# Patient Record
Sex: FEMALE | Race: WHITE | NOT HISPANIC OR LATINO | Employment: FULL TIME | URBAN - METROPOLITAN AREA
[De-identification: names, ages, dates, MRNs, and addresses within clinical notes are randomized per-mention and may not be internally consistent; named-entity substitution may affect disease eponyms.]

---

## 2017-01-28 ENCOUNTER — COMMUNICATION - HEALTHEAST (OUTPATIENT)
Dept: SCHEDULING | Facility: CLINIC | Age: 22
End: 2017-01-28

## 2018-05-30 ENCOUNTER — COMMUNICATION - HEALTHEAST (OUTPATIENT)
Dept: FAMILY MEDICINE | Facility: CLINIC | Age: 23
End: 2018-05-30

## 2021-05-30 ENCOUNTER — RECORDS - HEALTHEAST (OUTPATIENT)
Dept: ADMINISTRATIVE | Facility: CLINIC | Age: 26
End: 2021-05-30

## 2021-05-31 ENCOUNTER — RECORDS - HEALTHEAST (OUTPATIENT)
Dept: ADMINISTRATIVE | Facility: CLINIC | Age: 26
End: 2021-05-31

## 2021-06-01 ENCOUNTER — RECORDS - HEALTHEAST (OUTPATIENT)
Dept: ADMINISTRATIVE | Facility: CLINIC | Age: 26
End: 2021-06-01

## 2021-08-30 ENCOUNTER — HOSPITAL ENCOUNTER (EMERGENCY)
Facility: CLINIC | Age: 26
Discharge: HOME | End: 2021-08-30

## 2021-08-30 VITALS
HEART RATE: 73 BPM | SYSTOLIC BLOOD PRESSURE: 125 MMHG | RESPIRATION RATE: 16 BRPM | OXYGEN SATURATION: 95 % | HEIGHT: 65 IN | DIASTOLIC BLOOD PRESSURE: 79 MMHG | WEIGHT: 140 LBS | BODY MASS INDEX: 23.32 KG/M2 | TEMPERATURE: 98.1 F

## 2021-08-30 ASSESSMENT — MIFFLIN-ST. JEOR: SCORE: 1390.92

## 2021-08-30 NOTE — ED NOTES
Pt was seen earlier for UTI - is on Macrobid - states missed a day and was told to double up on the doses - has 2 days left - was on 5 day course. [facility] and gave UA that showed no infection although she had blood in her urine (started her menses today) and they wanted her checked for pyelonephritis vs kidney stone as she has continued right flank pain

## 2022-02-01 ENCOUNTER — OFFICE VISIT (OUTPATIENT)
Dept: OBGYN | Facility: CLINIC | Age: 27
End: 2022-02-01
Payer: COMMERCIAL

## 2022-02-01 VITALS
BODY MASS INDEX: 26.29 KG/M2 | WEIGHT: 158 LBS | DIASTOLIC BLOOD PRESSURE: 74 MMHG | OXYGEN SATURATION: 97 % | SYSTOLIC BLOOD PRESSURE: 124 MMHG | HEART RATE: 85 BPM

## 2022-02-01 DIAGNOSIS — R10.84 ABDOMINAL PAIN, GENERALIZED: Primary | ICD-10-CM

## 2022-02-01 DIAGNOSIS — N39.0 RECURRENT UTI: ICD-10-CM

## 2022-02-01 DIAGNOSIS — B96.89 BACTERIAL VAGINOSIS: ICD-10-CM

## 2022-02-01 DIAGNOSIS — R10.2 PELVIC PAIN IN FEMALE: ICD-10-CM

## 2022-02-01 DIAGNOSIS — N76.0 BACTERIAL VAGINOSIS: ICD-10-CM

## 2022-02-01 LAB
ALBUMIN UR-MCNC: NEGATIVE MG/DL
APPEARANCE UR: CLEAR
BACTERIA #/AREA URNS HPF: ABNORMAL /HPF
BILIRUB UR QL STRIP: NEGATIVE
CLUE CELLS: PRESENT
COLOR UR AUTO: YELLOW
GLUCOSE UR STRIP-MCNC: NEGATIVE MG/DL
HCG UR QL: NEGATIVE
HGB UR QL STRIP: ABNORMAL
KETONES UR STRIP-MCNC: NEGATIVE MG/DL
LEUKOCYTE ESTERASE UR QL STRIP: NEGATIVE
MUCOUS THREADS #/AREA URNS LPF: PRESENT /LPF
NITRATE UR QL: NEGATIVE
PH UR STRIP: 5.5 [PH] (ref 5–7)
RBC #/AREA URNS AUTO: ABNORMAL /HPF
SP GR UR STRIP: >=1.03 (ref 1–1.03)
SQUAMOUS #/AREA URNS AUTO: ABNORMAL /LPF
TRICHOMONAS, WET PREP: ABNORMAL
UROBILINOGEN UR STRIP-ACNC: 0.2 E.U./DL
WBC #/AREA URNS AUTO: ABNORMAL /HPF
WBC'S/HIGH POWER FIELD, WET PREP: ABNORMAL
YEAST, WET PREP: ABNORMAL

## 2022-02-01 PROCEDURE — 81001 URINALYSIS AUTO W/SCOPE: CPT | Performed by: OBSTETRICS & GYNECOLOGY

## 2022-02-01 PROCEDURE — 87591 N.GONORRHOEAE DNA AMP PROB: CPT | Performed by: OBSTETRICS & GYNECOLOGY

## 2022-02-01 PROCEDURE — 99204 OFFICE O/P NEW MOD 45 MIN: CPT | Performed by: OBSTETRICS & GYNECOLOGY

## 2022-02-01 PROCEDURE — 81025 URINE PREGNANCY TEST: CPT | Performed by: OBSTETRICS & GYNECOLOGY

## 2022-02-01 PROCEDURE — 87210 SMEAR WET MOUNT SALINE/INK: CPT | Performed by: OBSTETRICS & GYNECOLOGY

## 2022-02-01 PROCEDURE — 87491 CHLMYD TRACH DNA AMP PROBE: CPT | Performed by: OBSTETRICS & GYNECOLOGY

## 2022-02-01 RX ORDER — HYDROXYZINE HYDROCHLORIDE 10 MG/1
TABLET, FILM COATED ORAL
COMMUNITY
Start: 2021-10-12

## 2022-02-01 RX ORDER — METRONIDAZOLE 500 MG/1
500 TABLET ORAL 2 TIMES DAILY
Qty: 14 TABLET | Refills: 0 | Status: SHIPPED | OUTPATIENT
Start: 2022-02-01 | End: 2022-02-08

## 2022-02-01 RX ORDER — HYDROXYZINE HYDROCHLORIDE 10 MG/1
10 TABLET, FILM COATED ORAL
COMMUNITY
Start: 2021-10-12

## 2022-02-01 RX ORDER — NORETHINDRONE ACETATE 5 MG
5 TABLET ORAL DAILY
Qty: 90 TABLET | Refills: 0 | Status: SHIPPED | OUTPATIENT
Start: 2022-02-01

## 2022-02-01 RX ORDER — PNV NO.95/FERROUS FUM/FOLIC AC 28MG-0.8MG
1 TABLET ORAL DAILY
COMMUNITY
Start: 2021-03-25

## 2022-02-01 NOTE — PATIENT INSTRUCTIONS
Patient Education     Transvaginal Ultrasound (Endovaginal Ultrasound)   A transvaginal ultrasound is an imaging test. An ultrasound uses sound waves to form pictures of your organs that can be seen on a screen. It's often done with a probe placed on the belly. A transvaginal ultrasound uses a special probe that is put into your vagina. It uses sound waves to make pictures of your uterus, ovaries, and other pelvic organs. This test can be used to check symptoms such as pain. It can also check for problems. In pregnant women, it's used to check the unborn baby or fetus. The test is often done by a specially trained technologist called a sonographer.   Getting ready for your test    You may be asked to go to the bathroom. Your bladder may need to be empty before the test.    Tell the sonographer what medicines you take. Let him or her know if you have had pelvic surgery.    Answer any other questions the sonographer asks. Your answers will help him or her adapt the test to your health needs.    During your test    You may change into a hospital gown. You'll then lie down on an exam table with your knees raised, as you would for a pelvic exam.    The sonographer will use a thin hand-held probe. It's shaped like a tampon. The probe has a sterile cover and nongreasy gel. It's gently put inside your vagina. In some cases, you may be asked to put the probe in yourself, as you would a tampon.    The sonographer moves the probe to get the best picture. You may feel pressure. Tell the sonographer if you feel pain.    After your test  Before leaving, you may need to wait for a short time while the images are reviewed. You can go back to your normal routine right after the test. Your healthcare provider will let you know when the results of your test are ready.   Be aware that although the sonographer can answer questions about the test, only your healthcare provider can explain the results.   [employer] last reviewed this  educational content on 7/1/2020 2000-2021 [employer]. All rights reserved. This information is not intended as a substitute for professional medical care. Always follow your healthcare professional's instructions.           Patient Education     Mirena Intrauterine System 52 mg (0.197305 MG/HR)  Uses  This medicine is used for the following purposes:    birth control    menstrual bleeding  Instructions  A negative pregnancy test may be needed before receiving this medicine.  A second form of birth control may be needed for the first week after the semaj is placed. Ask your doctor or pharmacist about the need for back-up birth control.  Please tell your doctor and pharmacist about all the medicines you take. Include both prescription and over-the-counter medicines. Also tell them about any vitamins, herbal medicines, or anything else you take for your health.  The IUD should be removed after 5 years. After removal, a new IUD can be inserted if treatment is to be continued.  It is very important that you keep all appointments for medical exams and tests while on this medicine.  Cautions  Some patients taking this medicine have experienced serious side effects. Please speak with your doctor to understand the risks and benefits associated with this medicine.  Tell your doctor and pharmacist if you ever had an allergic reaction to a medicine. Symptoms of an allergic reaction can include trouble breathing, skin rash, itching, swelling, or severe dizziness.  Ask your doctor to show you how to perform a self breast examination. You should check your breasts once a month and report any changes to your doctor.  Talk to your doctor about getting a complete physical exam every year while on this medicine.  Check regularly to make sure the IUD is in the proper place. Contact your doctor right away if the IUD comes out or if you can not feel the threads.  Tell the doctor or pharmacist if you are pregnant, planning  to be pregnant, or breastfeeding.  Do not use this medicine if you are pregnant. If you become pregnant while on this medicine, contact your doctor immediately.  This medicine does not protect you or your partner against sexually transmitted diseases.  Ask your pharmacist if this medicine can interact with any of your other medicines. Be sure to tell them about all the medicines you take.  Please tell all your doctors and dentists that you are on this medicine before they provide care.  Do not start or stop any other medicines without first speaking to your doctor or pharmacist.  Side Effects  The following is a list of some common side effects from this medicine. Please speak with your doctor about what you should do if you experience these or other side effects.    breast pain or swelling    nausea    cramping of the uterus or bleeding from the vagina    vaginal bleeding or spotting between periods    weight gain  Call your doctor or get medical help right away if you notice any of these more serious side effects:    severe abdominal or pelvic pain    breast lumps    depression or feeling sad    fever or chills    severe or persistent headache    mood changes    pain during sexual intercourse    dark urine    vaginal itching or discharge    severe or persistent vomiting    yellowing of the eyes    yellowing of the skin  A few people may have an allergic reactions to this medicine. Symptoms can include difficulty breathing, skin rash, itching, swelling, or severe dizziness. If you notice any of these symptoms, seek medical help quickly.  Extra  Please speak with your doctor, nurse, or pharmacist if you have any questions about this medicine.  [email or web address]  IMPORTANT NOTE: This document tells you briefly how to take your medicine, but it does not tell you all there is to know about it.Your doctor or pharmacist may give you other documents about your medicine. Please talk to them if  you have any questions.Always follow their advice. There is a more complete description of this medicine available in English.Scan this code on your smartphone or tablet or use the web address below. You can also ask your pharmacist for a printout. If you have any questions, please ask your pharmacist.      [employer].           Patient Education     Lupron Depot (6-month) Prefilled Syringe 45 mg / 1.5 mL  Uses  This medicine is used for the following purposes:    hormone imbalance    cancer  Instructions  This medicine is given as an injection into a muscle.  Carefully follow the instructions for preparing this medicine before injection.  Read and make sure you understand the instructions for measuring your dose and using the syringe before using this medicine.  The medicine needs to be mixed before being injected.  Do not mix this medicine with other solutions.  Always inspect the medicine before using.  The liquid should be milky in appearance.  Do not use the medicine if it contains any particles or if it has changed color.  Gently swirl to mix the medicine. Do not shake.  Keep this medicine at room temperature.  Do not freeze the medicine.  Protect medicine from light.  Inject the medicine immediately after mixing.  Never use any medicine that has .  Discard any remaining medicine after your dose is given.  Discard any unused, mixed medicine after 2 hours.  Please ask your doctor, nurse, or pharmacist how to discard unused medicines safely.  Space doses evenly to keep a steady amount of medicine in the body.  Ask your doctor, nurse or pharmacist to show you how to use this medicine correctly.  You or a family member can be trained to give this medicine at home.  Do not inject into or near a vein, artery or nerve.  Do not inject into skin that is red, swollen or itchy.  Change the location of the injection each time. Choose a location at least 1 inch from the last injection.  Do not rub or  massage the area where the injection was given.  Tell your doctor if you have severe or persistent sweating, diarrhea or vomiting. These can increase your risk of a serious side effect.  It is important that you keep taking each dose of this medicine on time even if you are feeling well.  If you miss a dose, contact your doctor for instructions.  Please tell your doctor and pharmacist about all the medicines you take. Include both prescription and over-the-counter medicines. Also tell them about any vitamins, herbal medicines, or anything else you take for your health.  If your symptoms do not improve or they worsen while on this medicine, contact your doctor.  Do not suddenly stop taking this medicine. Check with your doctor before stopping.  It is very important that you continue using this medicine for the full number of days that it is prescribed. Please do not stop the medicine even if you start to feel better after the first few days.  This medicine may cause higher blood sugar levels or diabetes. Please follow your doctor's instructions and check your blood sugar level regularly while on this medicine.  This medicine may affect the strength of your bones. If you have or are at increased risk for developing osteoporosis (weakening of the bones), your doctor may recommend adding foods containing calcium and vitamin D while on this medicine. Please talk to your doctor for more information.  You may need vitamin and mineral supplements while on this medicine. Please speak with your doctor or pharmacist.  It is very important that you follow your doctor's instructions for all blood tests.  This medicine may interfere with some lab test results. Be sure to tell all your healthcare providers that you are taking this medicine.  It is very important that you keep all appointments for medical exams and tests while on this medicine.  Cautions  Tell your doctor and pharmacist if you ever had an allergic reaction to a  medicine. Symptoms of an allergic reaction can include trouble breathing, skin rash, itching, swelling, or severe dizziness.  This medicine is associated with a rare but very serious medical condition. Please speak with your doctor about symptoms you should look out for while on this medicine. Notify your doctor immediately if you develop those symptoms.  Some patients taking this medicine have experienced serious side effects. Please speak with your doctor to understand the risks and benefits associated with this medicine.  This medicine is associated with an increased risk of serious heart problems, heart attack, and stroke. Please speak with your doctor about the risks and benefits of using this medicine. Contact your doctor immediately if you experience chest pain or difficulty breathing.  Do not use the medication any more than instructed.  Your ability to stay alert or to react quickly may be impaired by this medicine. Do not drive or operate machinery until you know how this medicine will affect you.  Please check with your doctor before drinking alcohol while on this medicine.  Avoid smoking while on this medicine. Smoking may increase your risk for stroke, heart attack, blood clots, high blood pressure, and other diseases of the heart and blood vessels.  Tell the doctor or pharmacist if you are pregnant, planning to be pregnant, or breastfeeding.  Do not breastfeed while on this medicine.  Do not use this medicine if you are pregnant. If you become pregnant while on this medicine, contact your doctor immediately.  This medicine can hurt a new baby in the womb. If you become pregnant while on this medicine, tell your doctor immediately. Your doctor may switch you to a different medicine.  Ask your pharmacist if this medicine can interact with any of your other medicines. Be sure to tell them about all the medicines you take.  Please tell all your doctors and dentists that you are on this medicine before they  provide care.  Do not start or stop any other medicines without first speaking to your doctor or pharmacist.  If you have had a heart attack or a stroke within the past 6 months, talk to your doctor before using this medicine.  Used needles and syringes should be thrown away properly in a medical waste container. Ask your doctor or pharmacist if you need help.  Do not share this medicine with anyone who has not been prescribed this medicine.  Side Effects  The following is a list of some common side effects from this medicine. Please speak with your doctor about what you should do if you experience these or other side effects.    agitated feeling or trouble sleeping    breast pain or swelling    dizziness    swelling of the legs, feet, and hands    lack of energy and tiredness    feeling of heat or flushing    headaches    reaction at the area of the injection (pain, redness, swelling)    pain in the joints    pain near injection site    problems with sexual functions or desire    sweating at night    increased urinary frequency  Call your doctor or get medical help right away if you notice any of these more serious side effects:    severe or persistent bone, joint or jaw pain    chest pain    difficulty concentrating    confusion    fainting    severe or persistent headache    fast or irregular heart beats    sudden leg pain, swelling, warmth or redness    mood changes    seizures    shortness of breath    reduced sperm count in semen    symptoms of stroke (such as one-sided weakness, slurred speech, confusion)    suicidal thoughts    sweating    excessive thirst    unusual or unexplained tiredness or weakness    difficulty or discomfort urinating    blood in urine    blurring or changes of vision    severe or persistent vomiting  A few people may have an allergic reactions to this medicine. Symptoms can include difficulty breathing, skin rash, itching, swelling, or severe dizziness. If you notice any of these  symptoms, seek medical help quickly.  Extra  Please speak with your doctor, nurse, or pharmacist if you have any questions about this medicine.  [email or web address]  IMPORTANT NOTE: This document tells you briefly how to take your medicine, but it does not tell you all there is to know about it.Your doctor or pharmacist may give you other documents about your medicine. Please talk to them if you have any questions.Always follow their advice. There is a more complete description of this medicine available in English.Scan this code on your smartphone or tablet or use the web address below. You can also ask your pharmacist for a printout. If you have any questions, please ask your pharmacist.     2021 [employer].           Patient Education     Pelvic Pain, Uncertain Cause    Pelvic pain is pain felt in the lowest part of the belly (abdomen) and between the hipbones. The pain may occur suddenly and recently (acute). Or the pain may last for 6 months or longer (chronic).   There are many possible causes of pelvic pain. The pain may be due to a problem in the female reproductive system. Or it may be due to a problem in the digestive, urinary, or musculoskeletal systems.   Based on your visit today, the exact cause of your pelvic pain is not certain. Your condition doesn't seem to be serious at this time. But it is important for you to keep watching for any new symptoms or worsening of your condition.   General care  Your healthcare provider may advise a number of ways to help manage your pain. These can include:     Taking over-the-counter pain medicine. Stronger pain medicine may also be prescribed, if needed.    Applying heat to the pelvic area. Use a heating pad or a hot pack. Taking a hot bath may also help.    Getting plenty of rest.    Making certain lifestyle changes. These can include practicing good posture and getting regular exercise. Studies have shown that these changes  help reduce pelvic pain in some women.    Seeing a physical therapist or pain specialist. These healthcare providers can discuss other ways to manage pain with you.    Acupressure or acupuncture.  Follow-up care  Follow up with your healthcare provider, or as advised.    When to get medical advice  Call your healthcare provider right away if any of the following occur:     Fever of 100.4 F or higher, or as directed by your healthcare provider    Pain gets worse or you have sudden, severe pain or new pain    Nausea, vomiting, sweating, or restlessness    Dizziness or fainting    Abnormal vaginal discharge    Abnormal vaginal bleeding (especially bleeding after menopause)  [employer] last reviewed this educational content on 6/1/2020 2000-2021 [employer]. All rights reserved. This information is not intended as a substitute for professional medical care. Always follow your healthcare professional's instructions.

## 2022-02-01 NOTE — PROGRESS NOTES
"[facility]    CC: \"hysterectomy consult\"    S:Elsa Lomax is a 26 year old  who presents today for discussion of hysterectomy.      Per chart review:  Patient with history of pelvic pain.  Previously seen at outside OBGYN provider.    Most recent visit 22:  Under care of Dr. Mcqueen patient with chronic pelvic pain.  Per that note patient tried Paragard IUD, OCPs, and Depo provera in past with no improvement in symptoms.  \"She very strongly desires definitive management of her pain with hysterectomy at this point. She is certain she does not want children. Would adopt if she ever changed her mind. \"  That provider planned [facility] BS, which was scheduled for mid Feb.      Last ultrasound:  EXAM: US PELVIS COMPLETE TA AND TV WITH DUPLEX   LOCATION: [facility]   DATE/TIME: 2019 3:16 PM     INDICATION: Pelvic Pain   COMPARISON: None.   TECHNIQUE: Transabdominal scans were performed. Endovaginal ultrasound was   performed to better visualize the adnexa.     FINDINGS:   Uterus measures 8.2 x 6.0 x 4.1 cm. IUD appears in good position. Otherwise   normal uterus and visualized endometrial stripe measuring up to 6 mm.     Right ovary measures 2.7 x 2.0 x 1.7 cm. Normal right ovary. Normal arterial   duplex.     Left ovary measures 3.2 x 2.7 x 1.2 cm. Normal left ovary. Normal arterial   duplex.     No adnexal mass or pelvic fluid collection is seen.     CONCLUSION:   1.  IUD in good position. Otherwise normal pelvic ultrasound.      Patient reports to me today that she has a long history of pelvic pain.  She also has pain with intercourse.  It pre-dates her  and seems to have worsen since her  delivery in 2018.  She states that currently the pain is internal discomfort diffusely in the abdomen and low abdomen.  She denies any dysuria or hematuria, but she does have a history of recurrent UTIs and persistent unexplained hematuria.  Patient has " "history of abdominal pain, bloating, and constipation.  She states her bowel movements now are \"regular\".  On 22 patient saw Nasir Orozco and was recommended to have GI consultation.  She was also started on protonix at that time.     Patient reports her goal is to have her uterus removed.  She states that she does not want any future pregnancies.     OBGYN Hx:   OB History    Para Term  AB Living   1 1 1 0 0 1   SAB IAB Ectopic Multiple Live Births   0 0 0 0 1      # Outcome Date GA Lbr Seth/2nd Weight Sex Delivery Anes PTL Lv   1 Term 18 38w0d  3.541 kg (7 lb 12.9 oz) F CS-Unspec  N DON      Complications: Failure to Progress in First Stage      Name: Kelsey Haq       Patient's last menstrual period was 2022 (approximate).  Menses: sporadic.  Lasts a few days when they do occur.  Not particularly painful compared to her baseline pain  Not currently sexually active  STD Hx: none  Pap hx:  19 ASCUS HPV positive  2020 - NILM  10/12/21- NILM HPV negative     PMH: pelvic pain, scoliosis  PSH:  Past Surgical History:   Procedure Laterality Date     APPENDECTOMY  2007    Ruptured     ARTHROSCOPY KNEE Left        Meds: ibuprofen  Allergies:   Allergies   Allergen Reactions     Pneumococcal 13-Dayana Conj Vacc Dermatitis     Prevnar      Sulfa Drugs        SH: Denies any tobacco or drug use.  Consumes 1 drink per week  FH: Denies any family history of bleeding or clotting disorders.  Denies any family history of cancer    O: Patient Vitals for the past 24 hrs:   BP Pulse SpO2 Weight   22 1303 124/74 85 97 % 71.7 kg (158 lb)   ]  Exam:  General- awake, alert, answering questions appropriately, tearful at times throughout discussion  CV- regular rate  Lung- breathing comfortably on room air  - patient opts for self collect wet prep and GC/CT instead of collection with pelvic exam    Imaging and Labs:  Results for orders placed or performed in visit on 22   UA " Macro with Reflex to Micro and Culture - lab collect     Status: Abnormal    Specimen: Urine, Midstream   Result Value Ref Range    Color Urine Yellow Colorless, Straw, Light Yellow, Yellow    Appearance Urine Clear Clear    Glucose Urine Negative Negative mg/dL    Bilirubin Urine Negative Negative    Ketones Urine Negative Negative mg/dL    Specific Gravity Urine >=1.030 1.003 - 1.035    Blood Urine Trace (A) Negative    pH Urine 5.5 5.0 - 7.0    Protein Albumin Urine Negative Negative mg/dL    Urobilinogen Urine 0.2 0.2, 1.0 E.U./dL    Nitrite Urine Negative Negative    Leukocyte Esterase Urine Negative Negative   HCG Qual, Urine ([id number])     Status: Normal   Result Value Ref Range    hCG Urine Qualitative Negative Negative   Urine Microscopic     Status: Abnormal   Result Value Ref Range    Bacteria Urine Few (A) None Seen /HPF    RBC Urine 2-5 (A) 0-2 /HPF /HPF    WBC Urine 5-10 (A) 0-5 /HPF /HPF    Squamous Epithelials Urine Moderate (A) None Seen /LPF    Mucus Urine Present (A) None Seen /LPF    Narrative    Urine Culture not indicated   Wet prep - lab collect     Status: Abnormal    Specimen: Vagina; Swab   Result Value Ref Range    Trichomonas Absent Absent    Yeast Absent Absent    Clue Cells Present (A) Absent    WBCs/high power field 1+ (A) None       A&P: Elsa Lomax is a 26 year old  who presents today for abdominal/pelvic pain.    (R10.84) Abdominal pain, generalized  (primary encounter diagnosis)  Comment: Reviewed with patient potential causes/sources of abdominal and pelvic pain include GI sources, MSK sources, Urinary source, and GYN source.  As of now more data is needed to make the determination that her pain source is her uterus.  If following complete work up the source of pain is her uterus and if the patient fails medical management, that is the time in which I would consider surgical management with hysterectomy.  Prior to that time I would not recommend hysterectomy.  I  reviewed with patient that given her abdominal pain, bloating, and intermittent constipation I recommend GI referral to rule out GI source of pain.  Plan: Adult Gastro Ref - Consult Only    (R10.2) Pelvic pain in female  Comment: Continued to discuss with patient GYN sources of pelvic pain including uterine fibroids, painful menses, endometrial polyps causing increased pelvic cramping, structural ovarian sources of pain.  Patient previously used copper IUD with no change in her pain.  Explained to patient that this is a non-hormonal source of contraceptive and I would not consider it treatment for pelvic pain as many women have heavier and more painful periods with the copper IUD.  For menstrual management and pain treatment I would recommend OCP vs. Depo provera vs. Mirena IUD vs. Depo lupron.  Explaiend depo lupron with add back therapy to treat pain.  If pain is persistent with this treatment, that provides good evidence that the source of pain is not uterine or ovarian/endometriosis type pain.  The patient is open to this idea.  Her insurance require prior authorization for this, which was requested.  I also reviewed infectious contributors to pelvic pain, which patient agrees to test for today. Pregnancy rule out today with UPT.  Following this discussion the patient is very frustrated and tearful that we are not planning to just remove the uterus with hysterectomy.  I explained hysterectomy is a major abdominal surgery and without proper work up to determine uterine source of pain, it is not my recommendation to proceed with surgery unless it is indicated.  Also reviewed that up to 50% of women who undergo hysterectomy for chronic pelvic pain continue to have pelvic pain post-op and I want to ensure we are doing the right thing to treat her pain.  Plan: US Transvaginal Pelvic Non-OB, Wet prep - lab         collect, NEISSERIA GONORRHOEA PCR, CHLAMYDIA         TRACHOMATIS PCR, HCG Qual, Urine ([id number]),          norethindrone (AYGESTIN) 5 MG tablet,         leuprolide (LUPRON DEPOT) kit 11.25 mg,     (N39.0) Recurrent UTI  Comment: Patient with history of recurrent UTI and persistent hematuria.  Given persistent pelvic pain, I recommend referral and further work up with urogyn.  Consideration of Insterstitial cystitis as possible pain source.  Plan: Adult Urology Referral, UA Macro with Reflex to        Micro and Culture - lab collect, Urine         Microscopic    (N76.0,  B96.89) Bacterial vaginosis  Comment: Patient with pelvic pain and pain with intercourse as well as clue cells on wet prep.  Will treat for BV with PO flagyl 500mg BID.    Plan: metroNIDAZOLE (FLAGYL) 500 MG tablet      Patient instructed to follow up after evaluation from GI, Urogyn, and ultrasound results to review recommendations and next steps.    Rossy Winchester MD

## 2022-02-01 NOTE — LETTER
February 2, 2022      Elsa Lomax  [address] [address] [place] [address]        Dear ,    We are writing to inform you of your test results.    Your test results fall within the expected range(s) or remain unchanged from previous results.  Please continue with current treatment plan.    Resulted Orders   Wet prep - lab collect   Result Value Ref Range    Trichomonas Absent Absent    Yeast Absent Absent    Clue Cells Present (A) Absent    WBCs/high power field 1+ (A) None   NEISSERIA GONORRHOEA PCR   Result Value Ref Range    Neisseria gonorrhoeae Negative Negative      Comment:      Negative for N. gonorrhoeae rRNA by transcription mediated amplification. A negative result by transcription mediated amplification does not preclude the presence of C. trachomatis infection because results are dependent on proper and adequate collection, absence of inhibitors and sufficient rRNA to be detected.   CHLAMYDIA TRACHOMATIS PCR   Result Value Ref Range    Chlamydia trachomatis Negative Negative      Comment:      A negative result by transcription mediated amplification does not preclude the presence of C. trachomatis infection because results are dependent on proper and adequate collection, absence of inhibitors and sufficient rRNA to be detected.   UA Macro with Reflex to Micro and Culture - lab collect   Result Value Ref Range    Color Urine Yellow Colorless, Straw, Light Yellow, Yellow    Appearance Urine Clear Clear    Glucose Urine Negative Negative mg/dL    Bilirubin Urine Negative Negative    Ketones Urine Negative Negative mg/dL    Specific Gravity Urine >=1.030 1.003 - 1.035    Blood Urine Trace (A) Negative    pH Urine 5.5 5.0 - 7.0    Protein Albumin Urine Negative Negative mg/dL    Urobilinogen Urine 0.2 0.2, 1.0 E.U./dL    Nitrite Urine Negative Negative    Leukocyte Esterase Urine Negative Negative   HCG Qual, Urine ([id number])   Result Value Ref Range    hCG Urine Qualitative Negative  Negative      Comment:      This test is for screening purposes.  Results should be interpreted along with the clinical picture.  Confirmation testing is available if warranted by ordering [id number], HCG Quantitative Pregnancy.   Urine Microscopic   Result Value Ref Range    Bacteria Urine Few (A) None Seen /HPF    RBC Urine 2-5 (A) 0-2 /HPF /HPF    WBC Urine 5-10 (A) 0-5 /HPF /HPF    Squamous Epithelials Urine Moderate (A) None Seen /LPF    Mucus Urine Present (A) None Seen /LPF    Narrative    Urine Culture not indicated       If you have any questions or concerns, please call the clinic at the number listed above.       Sincerely,      Rossy Winchester MD

## 2022-02-02 LAB
C TRACH DNA SPEC QL NAA+PROBE: NEGATIVE
N GONORRHOEA DNA SPEC QL NAA+PROBE: NEGATIVE

## 2022-06-06 ENCOUNTER — E-VISIT (OUTPATIENT)
Dept: OBGYN | Facility: CLINIC | Age: 27
End: 2022-06-06
Payer: COMMERCIAL

## 2022-06-06 DIAGNOSIS — J02.9 SORE THROAT: ICD-10-CM

## 2022-06-06 PROCEDURE — 99421 OL DIG E/M SVC 5-10 MIN: CPT | Performed by: OBSTETRICS & GYNECOLOGY

## 2022-06-06 NOTE — PATIENT INSTRUCTIONS
Thank you for choosing us for your care. Given your symptoms, I would like you to do a lab-only visit to determine what is causing them.  I have placed the orders.  Please schedule an appointment with the [place], or call [phone].  I will let you know when the results are back and next steps to take.

## 2022-06-13 ENCOUNTER — OFFICE VISIT (OUTPATIENT)
Dept: FAMILY MEDICINE | Facility: CLINIC | Age: 27
End: 2022-06-13
Payer: COMMERCIAL

## 2022-06-13 ENCOUNTER — HOSPITAL ENCOUNTER (EMERGENCY)
Facility: CLINIC | Age: 27
Discharge: HOME | End: 2022-06-13
Attending: EMERGENCY MEDICINE | Admitting: EMERGENCY MEDICINE
Payer: COMMERCIAL

## 2022-06-13 ENCOUNTER — APPOINTMENT (OUTPATIENT)
Dept: MRI IMAGING | Facility: CLINIC | Age: 27
End: 2022-06-13
Attending: EMERGENCY MEDICINE
Payer: COMMERCIAL

## 2022-06-13 VITALS
OXYGEN SATURATION: 100 % | SYSTOLIC BLOOD PRESSURE: 112 MMHG | DIASTOLIC BLOOD PRESSURE: 70 MMHG | WEIGHT: 163 LBS | HEART RATE: 72 BPM | TEMPERATURE: 98.4 F | BODY MASS INDEX: 27.12 KG/M2

## 2022-06-13 VITALS
TEMPERATURE: 99.5 F | HEART RATE: 77 BPM | RESPIRATION RATE: 16 BRPM | SYSTOLIC BLOOD PRESSURE: 131 MMHG | DIASTOLIC BLOOD PRESSURE: 88 MMHG | BODY MASS INDEX: 25.71 KG/M2 | HEIGHT: 66 IN | WEIGHT: 160 LBS | OXYGEN SATURATION: 99 %

## 2022-06-13 DIAGNOSIS — G43.109 MIGRAINE WITH AURA AND WITHOUT STATUS MIGRAINOSUS, NOT INTRACTABLE: ICD-10-CM

## 2022-06-13 DIAGNOSIS — H54.60 MONOCULAR VISION LOSS: ICD-10-CM

## 2022-06-13 DIAGNOSIS — R20.0 FACIAL NUMBNESS: Primary | ICD-10-CM

## 2022-06-13 DIAGNOSIS — R20.2 PARESTHESIA OF LEFT ARM: ICD-10-CM

## 2022-06-13 PROBLEM — Z3A.12 12 WEEKS GESTATION OF PREGNANCY: Status: ACTIVE | Noted: 2018-07-06

## 2022-06-13 PROBLEM — M54.42 CHRONIC MIDLINE LOW BACK PAIN WITH BILATERAL SCIATICA: Status: ACTIVE | Noted: 2020-10-01

## 2022-06-13 PROBLEM — M54.6 CHRONIC MIDLINE THORACIC BACK PAIN: Status: ACTIVE | Noted: 2020-10-01

## 2022-06-13 PROBLEM — M54.41 CHRONIC MIDLINE LOW BACK PAIN WITH BILATERAL SCIATICA: Status: ACTIVE | Noted: 2020-10-01

## 2022-06-13 PROBLEM — F43.23 ADJUSTMENT REACTION WITH ANXIETY AND DEPRESSION: Status: ACTIVE | Noted: 2018-10-22

## 2022-06-13 PROBLEM — Z87.39 HISTORY OF SCOLIOSIS: Status: ACTIVE | Noted: 2020-10-01

## 2022-06-13 PROBLEM — G89.29 CHRONIC MIDLINE THORACIC BACK PAIN: Status: ACTIVE | Noted: 2020-10-01

## 2022-06-13 PROBLEM — F51.01 INSOMNIA, IDIOPATHIC: Status: ACTIVE | Noted: 2021-10-12

## 2022-06-13 PROBLEM — R87.610 ASCUS WITH POSITIVE HIGH RISK HPV CERVICAL: Status: ACTIVE | Noted: 2019-02-12

## 2022-06-13 PROBLEM — R87.810 ASCUS WITH POSITIVE HIGH RISK HPV CERVICAL: Status: ACTIVE | Noted: 2019-02-12

## 2022-06-13 PROBLEM — Z34.02 PREGNANCY, FIRST, OBSTETRICAL CARE, SECOND TRIMESTER: Status: ACTIVE | Noted: 2018-07-24

## 2022-06-13 PROBLEM — G89.29 CHRONIC MIDLINE LOW BACK PAIN WITH BILATERAL SCIATICA: Status: ACTIVE | Noted: 2020-10-01

## 2022-06-13 LAB
ANION GAP SERPL CALCULATED.3IONS-SCNC: 8 MMOL/L (ref 5–18)
BASOPHILS # BLD AUTO: 0 10E3/UL (ref 0–0.2)
BASOPHILS NFR BLD AUTO: 0 %
BUN SERPL-MCNC: 9 MG/DL (ref 8–22)
C REACTIVE PROTEIN LHE: 0.7 MG/DL (ref 0–0.8)
CALCIUM SERPL-MCNC: 9 MG/DL (ref 8.5–10.5)
CHLORIDE BLD-SCNC: 105 MMOL/L (ref 98–107)
CO2 SERPL-SCNC: 23 MMOL/L (ref 22–31)
CREAT SERPL-MCNC: 0.71 MG/DL (ref 0.6–1.1)
EOSINOPHIL # BLD AUTO: 0.1 10E3/UL (ref 0–0.7)
EOSINOPHIL NFR BLD AUTO: 1 %
ERYTHROCYTE [DISTWIDTH] IN BLOOD BY AUTOMATED COUNT: 12.3 % (ref 10–15)
ERYTHROCYTE [SEDIMENTATION RATE] IN BLOOD BY WESTERGREN METHOD: 8 MM/HR (ref 0–20)
GFR SERPL CREATININE-BSD FRML MDRD: >90 ML/MIN/1.73M2
GLUCOSE BLD-MCNC: 95 MG/DL (ref 70–125)
GLUCOSE BLDC GLUCOMTR-MCNC: 87 MG/DL (ref 70–99)
HCT VFR BLD AUTO: 36.3 % (ref 35–47)
HGB BLD-MCNC: 11.8 G/DL (ref 11.7–15.7)
IMM GRANULOCYTES # BLD: 0 10E3/UL
IMM GRANULOCYTES NFR BLD: 0 %
LYMPHOCYTES # BLD AUTO: 2.4 10E3/UL (ref 0.8–5.3)
LYMPHOCYTES NFR BLD AUTO: 26 %
MCH RBC QN AUTO: 27.6 PG (ref 26.5–33)
MCHC RBC AUTO-ENTMCNC: 32.5 G/DL (ref 31.5–36.5)
MCV RBC AUTO: 85 FL (ref 78–100)
MONOCYTES # BLD AUTO: 0.5 10E3/UL (ref 0–1.3)
MONOCYTES NFR BLD AUTO: 6 %
NEUTROPHILS # BLD AUTO: 6 10E3/UL (ref 1.6–8.3)
NEUTROPHILS NFR BLD AUTO: 67 %
NRBC # BLD AUTO: 0 10E3/UL
NRBC BLD AUTO-RTO: 0 /100
PLATELET # BLD AUTO: 315 10E3/UL (ref 150–450)
POTASSIUM BLD-SCNC: 3.8 MMOL/L (ref 3.5–5)
RBC # BLD AUTO: 4.27 10E6/UL (ref 3.8–5.2)
SODIUM SERPL-SCNC: 136 MMOL/L (ref 136–145)
WBC # BLD AUTO: 9 10E3/UL (ref 4–11)

## 2022-06-13 PROCEDURE — 36415 COLL VENOUS BLD VENIPUNCTURE: CPT | Performed by: EMERGENCY MEDICINE

## 2022-06-13 PROCEDURE — A9585 GADOBUTROL INJECTION: HCPCS | Performed by: EMERGENCY MEDICINE

## 2022-06-13 PROCEDURE — 255N000002 HC RX 255 OP 636: Performed by: EMERGENCY MEDICINE

## 2022-06-13 PROCEDURE — 99204 OFFICE O/P NEW MOD 45 MIN: CPT | Performed by: PHYSICIAN ASSISTANT

## 2022-06-13 PROCEDURE — 99285 EMERGENCY DEPT VISIT HI MDM: CPT | Mod: 25

## 2022-06-13 PROCEDURE — 86140 C-REACTIVE PROTEIN: CPT | Performed by: EMERGENCY MEDICINE

## 2022-06-13 PROCEDURE — 85652 RBC SED RATE AUTOMATED: CPT | Performed by: EMERGENCY MEDICINE

## 2022-06-13 PROCEDURE — 85025 COMPLETE CBC W/AUTO DIFF WBC: CPT | Performed by: EMERGENCY MEDICINE

## 2022-06-13 PROCEDURE — 82310 ASSAY OF CALCIUM: CPT | Performed by: EMERGENCY MEDICINE

## 2022-06-13 PROCEDURE — 70553 MRI BRAIN STEM W/O & W/DYE: CPT

## 2022-06-13 RX ORDER — GADOBUTROL 604.72 MG/ML
7 INJECTION INTRAVENOUS ONCE
Status: COMPLETED | OUTPATIENT
Start: 2022-06-13 | End: 2022-06-13

## 2022-06-13 RX ADMIN — GADOBUTROL 7 ML: 604.72 INJECTION INTRAVENOUS at 20:00

## 2022-06-13 NOTE — PROGRESS NOTES
Chief Complaint   Patient presents with     Headache     Has had headache all day left eye blurry vision on one side of eye then started with left side face pain and left arm numbness  pulled off road and called 911 by time ambulance got there had feeling back in arm  still has headache        ASSESSMENT/PLAN:  Elsa was seen today for headache.    Diagnoses and all orders for this visit:    Facial numbness    Monocular vision loss    Paresthesia of left arm      Patient has an interesting story and the 20 minutes of monocular vision loss, facial numbness, drooping and difficulty swallowing along with the left arm paresthesias has me concerned for something more nefarious than al migraine such as TIA or stroke.  She recently restarted her NuvaRing which could make her at risk for clot.  She does not have any focal neurologic deficit on exam at this time but given the history I think it prudent she be evaluated at a higher center of care for advanced imaging.  Patient agrees to go to the ER.  ER called and given handoff    Jaydon Coles PA-C      SUBJECTIVE:  Elsa is a 26 year old female who presents to urgent care with left-sided facial numbness and weakness.  Patient woke up this morning with a headache.  This is not to abnormal for patient and does have a history of headache but has not been diagnosed with migraines or treated for this in the past.  During work she felt there was some flashing of the left side of her vision but has not difficult time fully describing how this experience was.  It was enough to worry her and she started calling ophthalmologist to be seen.  While driving home she began feeling numbness of the left side of her face that started into her jaw and spread upward and she noticed this on her tongue and throat as well.  She had difficulty swallowing but was able to.  She looked in the mirror and the left side of her face muscles were not working.  She also noticed left arm  numbness but was able to move this.  She called 911.  This lasted for about 20 minutes and by the time the ambulance arrived her symptoms more last had improved.  She no longer had the facial numbness or weakness but still had some pins-and-needles feeling of the left arm and a headache.  She does not have a history of blood clots.  She restarted her NuvaRing a month or 2 ago.  She has not worsened from the phone call to now which is about an hour later or so.    ROS: Pertinent ROS neg other than the symptoms noted above in the HPI.     OBJECTIVE:  /70   Pulse 72   Temp 98.4  F (36.9  C) (Oral)   Wt 73.9 kg (163 lb)   SpO2 100%   BMI 27.12 kg/m     GENERAL: healthy, alert and no distress  EYES: Eyes grossly normal to inspection, PERRL and conjunctivae and sclerae normal  HENT: ear canals and TM's normal, nose and mouth without ulcers or lesions  NECK: no adenopathy, nontender  RESP: lungs clear to auscultation - no rales, rhonchi or wheezes  CV: regular rate and rhythm, normal S1 S2, no S3 or S4, no murmur, click or rub, no peripheral edema and peripheral pulses strong  MS: no gross musculoskeletal defects noted, no edema  SKIN: no suspicious lesions or rashes  NEURO: Normal strength and tone, mentation intact and speech normal, cranial nerves II through XII grossly intact, upper and lower extremities and 5/5, finger-to-nose normal.  Every once in a while it looks like if she does not blink with her left eye, rapid alternating hand movements normal    DIAGNOSTICS    No results found for any visits on 06/13/22.     Current Outpatient Medications   Medication     hydrOXYzine (ATARAX) 10 MG tablet     Prenatal Vit-Fe Fumarate-FA (PRENATAL VITAMINS) 28-0.8 MG TABS     hydrOXYzine (ATARAX) 10 MG tablet     Ibuprofen (ADVIL PO)     norethindrone (AYGESTIN) 5 MG tablet     Current Facility-Administered Medications   Medication     leuprolide (LUPRON DEPOT) kit 11.25 mg      Patient Active Problem List    Diagnosis     Scoliosis (and kyphoscoliosis), idiopathic     Health Care Home     Shin splints      Past Medical History:   Diagnosis Date     Chicken pox     History of     Scoliosis      Past Surgical History:   Procedure Laterality Date     APPENDECTOMY  04/22/2007    Ruptured     ARTHROSCOPY KNEE Left      Family History   Problem Relation Age of Onset     No Known Problems Mother      No Known Problems Father      Cancer Maternal Grandmother         leukemia     Diabetes Paternal Grandmother      Social History     Tobacco Use     Smoking status: Never Smoker     Smokeless tobacco: Never Used   Substance Use Topics     Alcohol use: Yes     Comment: Alcoholic Drinks/day: one/year               The plan of care was discussed with the patient. They understand and agree with the course of treatment prescribed. A printed summary was given including instructions and medications.  The use of Dragon/CamStent dictation services may have been used to construct the content in this note; any grammatical or spelling errors are non-intentional. Please contact the author of this note directly if you are in need of any clarification.

## 2022-06-13 NOTE — ED PROVIDER NOTES
EMERGENCY DEPARTMENT ENCOUNTER      NAME: Elsa Lomax  AGE: 26 year old female  YOB: 1995  MRN: [id number]  EVALUATION DATE & TIME: No admission date for patient encounter.    PCP: Tobi Tang    ED PROVIDER: Greg Henriquez M.D.      Chief Complaint   Patient presents with     Numbness     Headache         FINAL IMPRESSION:  Complex vascular headache      ED COURSE & MEDICAL DECISION MAKING:    Pertinent Labs & Imaging studies reviewed. (See chart for details)  26 year old female presents to the Emergency Department for evaluation of numbness and a headache.  Patient reports slight headache earlier in the day with worsening.  Accompanied by numbness to the left side of her face and her left arm.  Symptoms lasted perhaps 20 minutes and now resolved.  Still with slight residual headache.  Patient seen in the triage area over concerns of central process/stroke.  Stroke code not called as patient's exam was very reassuring and nonfocal.  Greater concern was of complex migrainous type headache.  Out of caution we will proceed with MRI imaging.  Baseline blood work also being obtained to assess for inflammatory markers primarily patient appears non toxic with stable vitals signs. Overall exam is benign.        6:36 PM I met with the patient for the initial interview and physical examination. Discussed plan for treatment and workup in the ED.    8:07 PM Checked in on and updated patient.  Laboratory evaluation unremarkable.  8:54 PM.  MRI is normal.  Patient likely with vascular/migrainous headache with complex symptomatology.  Given findings plan will be for continued outpatient management  At the conclusion of the encounter I discussed the results of all of the tests and the disposition. The questions were answered and return precautions provided. The patient or family acknowledged understanding and was agreeable with the care plan.       PPE: Provider wore gloves, N95 mask, eye protection,  "surgical cap.     MEDICATIONS GIVEN IN THE EMERGENCY:  Medications - No data to display    NEW PRESCRIPTIONS STARTED AT TODAY'S ER VISIT  New Prescriptions    No medications on file          =================================================================    HPI    Patient information was obtained from: Patient    Use of Intrepreter: N/A         Elsa Lomax is a 26 year old female with a pertient medical history of 12 weeks gestation of pregnancy who presents to the ED for evaluation of numbness, and a headache.     This morning, patient started experiencing a bad headache, and denies history of migraines. States she started having arm, mouth, and face left sided numbness that lasted around 20 minutes. Endorses losing vision in her left eye completely, which has since resolved. Endorses a \"pins and needles\" sensation. States she has had nerve issues before. Endorses using a nasal spray yesterday. Patient denies any other complaints at this time.      REVIEW OF SYSTEMS   Constitutional:  Denies fever, chills  HENT: Positive for loss of vision in left eye (resolved).   Respiratory:  Denies productive cough or increased work of breathing  Cardiovascular:  Denies chest pain, palpitations  GI:  Denies abdominal pain, nausea, vomiting, or change in bowel or bladder habits   Musculoskeletal:  Denies any new muscle/joint swelling  Skin:  Denies rash   Neurologic:  Denies focal weakness. Positive for left sided numbness in arm, mouth, and face. Positive for headache.   All systems negative except as marked.     PAST MEDICAL HISTORY:  Past Medical History:   Diagnosis Date     Chicken pox     History of     Scoliosis        PAST SURGICAL HISTORY:  Past Surgical History:   Procedure Laterality Date     APPENDECTOMY  04/22/2007    Ruptured     ARTHROSCOPY KNEE Left          CURRENT MEDICATIONS:      Current Facility-Administered Medications:      leuprolide (LUPRON DEPOT) kit 11.25 mg, 11.25 mg, Intramuscular, Q90 " "Ranulfo Reyes Ariana, MD    Current Outpatient Medications:      hydrOXYzine (ATARAX) 10 MG tablet, , Disp: , Rfl:      hydrOXYzine (ATARAX) 10 MG tablet, Take 10 mg by mouth (Patient not taking: Reported on 6/13/2022), Disp: , Rfl:      Ibuprofen (ADVIL PO), Take 400 mg by mouth as needed for moderate pain, Disp: , Rfl:      norethindrone (AYGESTIN) 5 MG tablet, Take 1 tablet (5 mg) by mouth daily (Patient not taking: Reported on 6/13/2022), Disp: 90 tablet, Rfl: 0     Prenatal Vit-Fe Fumarate-FA (PRENATAL VITAMINS) 28-0.8 MG TABS, Take 1 tablet by mouth daily, Disp: , Rfl:     ALLERGIES:  Allergies   Allergen Reactions     Pneumococcal 13-Dayana Conj Vacc Dermatitis     Prevnar      Sulfa Drugs        FAMILY HISTORY:  Family History   Problem Relation Age of Onset     No Known Problems Mother      No Known Problems Father      Cancer Maternal Grandmother         leukemia     Diabetes Paternal Grandmother        SOCIAL HISTORY:   Social History     Socioeconomic History     Marital status: Single   Tobacco Use     Smoking status: Never Smoker     Smokeless tobacco: Never Used   Substance and Sexual Activity     Alcohol use: Yes     Comment: Alcoholic Drinks/day: one/year      Drug use: No     Sexual activity: Never     Comment: single        VITALS:  Patient Vitals for the past 24 hrs:   BP Temp Temp src Pulse Resp SpO2 Height Weight   06/13/22 1837 (!) 140/95 99.5  F (37.5  C) Temporal 80 20 100 % 1.676 m (5' 6\") 72.6 kg (160 lb)        PHYSICAL EXAM    Constitutional:  Awake, alert, in no apparent distress  HENT:  Normocephalic, Atraumatic. Bilateral external ears normal. Oropharynx moist. Nose normal. Neck- Normal range of motion with no guarding, No midline cervical tenderness, Supple, No stridor.   Eyes:  PERRL, EOMI with no signs of entrapment, Conjunctiva normal, No discharge.   Respiratory:  Normal breath sounds, No respiratory distress, No wheezing.    Cardiovascular:  Normal heart rate, Normal rhythm, No " appreciable rubs or gallops.   GI:  Soft, No tenderness, No distension, No palpable masses  Musculoskeletal:   No edema. Good range of motion in all major joints. No tenderness to palpation or major deformities noted.  Integument:  Warm, Dry, No erythema, No rash.   Neurologic:  Alert & oriented, Normal motor function, Normal sensory function, No focal deficits noted. Negative for ulnar drift, romberg, FNF, and SANDRA.   Psychiatric:  Affect normal, Judgment normal, Mood normal.     LAB:  All pertinent labs reviewed and interpreted.  Results for orders placed or performed during the hospital encounter of 06/13/22   MR Brain w/o & w Contrast     Status: None    Narrative    EXAM: MR BRAIN W/O and W CONTRAST  LOCATION: [facility]  DATE/TIME: 6/13/2022 7:31 PM    INDICATION: Transient ischemic attack (TIA)  COMPARISON: None.  CONTRAST: 7mL Gadavist  TECHNIQUE: Routine multiplanar multisequence head MRI without and with intravenous contrast.    FINDINGS:  INTRACRANIAL CONTENTS: No acute or subacute infarct. No mass, acute hemorrhage, or extra-axial fluid collections. Normal brain parenchymal signal. Normal ventricles and sulci. Normal position of the cerebellar tonsils. No pathologic contrast enhancement.   Normal brainstem, cerebellum and white matter tracts.    SELLA: No abnormality accounting for technique.    OSSEOUS STRUCTURES/SOFT TISSUES: Normal marrow signal. The major intracranial vascular flow voids are maintained.     ORBITS: No abnormality accounting for technique.     SINUSES/MASTOIDS: No paranasal sinus mucosal disease. No middle ear or mastoid effusion.       Impression    IMPRESSION:  1.  Normal head MRI.   Basic metabolic panel     Status: Normal   Result Value Ref Range    Sodium 136 136 - 145 mmol/L    Potassium 3.8 3.5 - 5.0 mmol/L    Chloride 105 98 - 107 mmol/L    Carbon Dioxide (CO2) 23 22 - 31 mmol/L    Anion Gap 8 5 - 18 mmol/L    Urea Nitrogen 9 8 - 22 mg/dL    Creatinine  0.71 0.60 - 1.10 mg/dL    Calcium 9.0 8.5 - 10.5 mg/dL    Glucose 95 70 - 125 mg/dL    GFR Estimate >90 >60 mL/min/1.73m2   Erythrocyte sedimentation rate auto     Status: Normal   Result Value Ref Range    Erythrocyte Sedimentation Rate 8 0 - 20 mm/hr   CRP inflammation     Status: Normal   Result Value Ref Range    CRP 0.7 0.0 - 0.8 mg/dL   Glucose by meter     Status: Normal   Result Value Ref Range    GLUCOSE BY METER POCT 87 70 - 99 mg/dL   CBC with platelets and differential     Status: None   Result Value Ref Range    WBC Count 9.0 4.0 - 11.0 10e3/uL    RBC Count 4.27 3.80 - 5.20 10e6/uL    Hemoglobin 11.8 11.7 - 15.7 g/dL    Hematocrit 36.3 35.0 - 47.0 %    MCV 85 78 - 100 fL    MCH 27.6 26.5 - 33.0 pg    MCHC 32.5 31.5 - 36.5 g/dL    RDW 12.3 10.0 - 15.0 %    Platelet Count 315 150 - 450 10e3/uL    % Neutrophils 67 %    % Lymphocytes 26 %    % Monocytes 6 %    % Eosinophils 1 %    % Basophils 0 %    % Immature Granulocytes 0 %    NRBCs per 100 WBC 0 <1 /100    Absolute Neutrophils 6.0 1.6 - 8.3 10e3/uL    Absolute Lymphocytes 2.4 0.8 - 5.3 10e3/uL    Absolute Monocytes 0.5 0.0 - 1.3 10e3/uL    Absolute Eosinophils 0.1 0.0 - 0.7 10e3/uL    Absolute Basophils 0.0 0.0 - 0.2 10e3/uL    Absolute Immature Granulocytes 0.0 <=0.4 10e3/uL    Absolute NRBCs 0.0 10e3/uL   CBC with platelets differential     Status: None    Narrative    The following orders were created for panel order CBC with platelets differential.  Procedure                               Abnormality         Status                     ---------                               -----------         ------                     CBC with platelets and d...[[id number]]                      Final result                 Please view results for these tests on the individual orders.     RADIOLOGY:  Reviewed all pertinent imaging. Please see official radiology report.  MR Brain w/o & w Contrast    (Results Pending)         Brigid WU, am serving as a scribe  to document services personally performed by Greg Henriquez MD, based on my observation and the provider's statements to me. I, Greg Henriquez MD attest that Brigid Garry is acting in a scribe capacity, has observed my performance of the services and has documented them in accordance with my direction.    Greg Henriquez M.D.  Emergency Medicine  [facility] EMERGENCY ROOM     Greg Henriquez MD  06/13/22 2055

## 2022-06-13 NOTE — ED TRIAGE NOTES
Arrives to ED with c/o HA that began this morning. Reports N/T to LUE beginning approximately 1545. Reports lost vision in L eye, resolved at this time. Reports experienced L sided facial numbness, tongue and throat. No facial droop noted. Provider to triage to evaluate for stroke code, not called.      Triage Assessment     Row Name 06/13/22 4908       Triage Assessment (Adult)    Airway WDL WDL       Respiratory WDL    Respiratory WDL WDL       Skin Circulation/Temperature WDL    Skin Circulation/Temperature WDL WDL       Cardiac WDL    Cardiac WDL WDL       Peripheral/Neurovascular WDL    Peripheral Neurovascular WDL WDL       Cognitive/Neuro/Behavioral WDL    Cognitive/Neuro/Behavioral WDL WDL

## 2022-06-13 NOTE — ED NOTES
Expected Patient Referral to ED  6:19 PM    Referring Clinic/Provider:  Walk-in Clinic    Reason for referral/Clinical facts:  Headache followed by facial and arm weakness, symptoms resolving, started this morning.    Recommendations provided:  Send to ED for further evaluation    Caller was informed that this institution does possess the capabilities and/or resources to provide for patient and should be transferred to our facility.    Discussed that if direct admit is sought and any hurdles are encountered, this ED would be happy to see the patient and evaluate.    Informed caller that recommendations provided are recommendations based only on the facts provided and that they responsible to accept or reject the advice, or to seek a formal in person consultation as needed and that this ED will see/treat patient should they arrive.      Golden Marquez MD  [facility] EMERGENCY ROOM  [address] [address] [phone]     Golden Marquez MD  06/13/22 4319

## 2022-06-14 NOTE — DISCHARGE INSTRUCTIONS
Your laboratory evaluation and MRI today were completely normal.  Your symptoms likely represent an unusual variant of a headache causing brief neurologic symptoms.

## 2022-07-05 ENCOUNTER — NURSE TRIAGE (OUTPATIENT)
Dept: NURSING | Facility: CLINIC | Age: 27
End: 2022-07-05

## 2022-07-05 ENCOUNTER — APPOINTMENT (OUTPATIENT)
Dept: ULTRASOUND IMAGING | Facility: CLINIC | Age: 27
End: 2022-07-05
Attending: EMERGENCY MEDICINE
Payer: COMMERCIAL

## 2022-07-05 ENCOUNTER — HOSPITAL ENCOUNTER (EMERGENCY)
Facility: CLINIC | Age: 27
Discharge: HOME | End: 2022-07-05
Attending: EMERGENCY MEDICINE | Admitting: EMERGENCY MEDICINE
Payer: COMMERCIAL

## 2022-07-05 VITALS
OXYGEN SATURATION: 100 % | RESPIRATION RATE: 18 BRPM | DIASTOLIC BLOOD PRESSURE: 71 MMHG | HEART RATE: 66 BPM | BODY MASS INDEX: 25.71 KG/M2 | WEIGHT: 160 LBS | SYSTOLIC BLOOD PRESSURE: 114 MMHG | HEIGHT: 66 IN | TEMPERATURE: 98.4 F

## 2022-07-05 DIAGNOSIS — Z34.90 INTRAUTERINE PREGNANCY: ICD-10-CM

## 2022-07-05 DIAGNOSIS — N83.11 CORPUS LUTEUM CYST OF RIGHT OVARY: ICD-10-CM

## 2022-07-05 LAB
HCG SERPL QL: POSITIVE
HCG SERPL-ACNC: ABNORMAL MLU/ML (ref 0–4)

## 2022-07-05 PROCEDURE — 76801 OB US < 14 WKS SINGLE FETUS: CPT

## 2022-07-05 PROCEDURE — 36415 COLL VENOUS BLD VENIPUNCTURE: CPT | Performed by: EMERGENCY MEDICINE

## 2022-07-05 PROCEDURE — 84703 CHORIONIC GONADOTROPIN ASSAY: CPT | Performed by: EMERGENCY MEDICINE

## 2022-07-05 PROCEDURE — 84702 CHORIONIC GONADOTROPIN TEST: CPT | Performed by: EMERGENCY MEDICINE

## 2022-07-05 PROCEDURE — 99284 EMERGENCY DEPT VISIT MOD MDM: CPT | Mod: 25

## 2022-07-05 NOTE — TELEPHONE ENCOUNTER
Patient is having pain that started today.  It is a sharp pain, she states it is like sharp pain on her right side.  Patient had diarrhea today 7 x and threw up x1.  Patient states she just learned she is also pregnant through a pregnancy test.  Patient states she has had many issues with pregnancies but hasnt had this before.  A couple weeks ago the patient passed out.  She was having stroke symptoms was evaluated and she came up fine.    Patient is concerned this could be an ectopic pregnancy.  Patient denies any bleeding, she is not confused or weak.  Her pain is moderate to severe.  It was not allowing her to work today.  She is having shoulder pain, but states that is not unusual due to her scoliosis.    Plan is for patient to go to ED for evaluation.    Pat Guan RN   07/05/22 6:11 PM  [facility] Nurse Advisor    Reason for Disposition    MODERATE-SEVERE abdominal pain (e.g., interferes with normal activities, awakens from sleep)    Additional Information    Negative: Passed out (i.e., lost consciousness, collapsed and was not responding)    Negative: Shock suspected (e.g., cold/pale/clammy skin, too weak to stand, low BP, rapid pulse)    Negative: Difficult to awaken or acting confused (e.g., disoriented, slurred speech)    Negative: Sounds like a life-threatening emergency to the triager    Negative: Followed an abdomen (stomach) injury    Negative: [1] Abdominal pain AND [2] pregnant > 20 weeks    Protocols used: PREGNANCY - ABDOMINAL PAIN LESS THAN 20 WEEKS EGA-A-

## 2022-07-05 NOTE — ED TRIAGE NOTES
Pt presents to the ED with c/o lower abdominal pain and cramping for a couple days. Pt states she is roughly 6 wks pregnant. Denies vaginal bleeding.

## 2022-07-06 NOTE — ED PROVIDER NOTES
"EMERGENCY DEPARTMENT ENCOUNTER            IMPRESSION:  Intrauterine pregnancy  Corpus luteum cyst      MEDICAL DECISION MAKING:  Patient here for evaluation of right pelvic discomfort.  She had a positive pregnancy test earlier today.  She has noticed some intermittent right pelvic discomfort.  No vaginal bleeding.  STI test completed earlier today    On exam her vital signs are normal.  She has no abdominal tenderness.  Vaginal exam shows a closed cervix and no bleeding.  There is some very subtle right adnexal tenderness    Patient declined pain medication    hCG is positive.  hCG quant ordered and is pending    Ultrasound shows intrauterine pregnancy with small right corpus luteum cyst    Patient stable for discharge home      =================================================================  CHIEF COMPLAINT:  Chief Complaint   Patient presents with     Abdominal Pain         HPI  Elsa Lomax is a 26 year old pregnant female () with a history of miscarriage, scoliosis, cystitis, s/p appendectomy, UTI with hematuria, pyelonephritis, and pelvic pain (since 2016) who presents to the ED by walk in for evaluation of lower abdominal pain and cramping. Patient took a pregnancy test today, and it was positive. She believes she is six weeks pregnant but has not yet had an ultrasound. She is currently experiencing pain in her lower hip and pelvic area that she say \"knocks the wind out of me\". She denies vaginal bleeding. She is worried about ectopic pregnancy. Patient has had two miscarriages in the past, and she dealt with complications.    Patient was also concerned because she was recently seen in the ED for left-sided weakness and loss of consciousness. She was told it was a vascular migraine. She said she had migraines with her previous pregnancy with her daughter.       I, Kiya Abernathy am serving as a scribe to document services personally performed by Dr. Golden Williamson MD, based on my " observation and the provider's statements to me. I, Dr. Golden Williamson MD attest that Kiya Abernathy is acting in a scribe capacity, has observed my performance of the services and has documented them in accordance with my direction.      REVIEW OF SYSTEMS   Constitutional: Denies fever, chills, unintentional weight loss or fatigue   Eyes: Denies visual changes or discharge    HENT: Denies sore throat, ear pain or neck pain  Respiratory: Denies cough or shortness of breath    Cardiovascular: Denies chest pain, palpitations or leg swelling  GI: Denies abdominal pain, nausea, vomiting, or dark, bloody stools.    : Denies vaginal bleeding, hematuria, dysuria, or flank pain  Musculoskeletal: Denies any new back pain or new muscle/joint pains. Positive for lower abdominal pain, cramping.  Skin: Denies rash or wound  Neurologic: Denies current headache, new weakness, focal weakness, or sensory changes    Lymphatic: Denies swollen glands    Psychiatric: Denies depression, suicidal ideation or homicidal ideation.    Remainder of systems reviewed, unless noted in HPI all others negative.      PAST MEDICAL HISTORY:  Past Medical History:   Diagnosis Date     Chicken pox     History of     Scoliosis        PAST SURGICAL HISTORY:  Past Surgical History:   Procedure Laterality Date     APPENDECTOMY  04/22/2007    Ruptured     ARTHROSCOPY KNEE Left          CURRENT MEDICATIONS:    hydrOXYzine (ATARAX) 10 MG tablet  hydrOXYzine (ATARAX) 10 MG tablet  Ibuprofen (ADVIL PO)  norethindrone (AYGESTIN) 5 MG tablet  Prenatal Vit-Fe Fumarate-FA (PRENATAL VITAMINS) 28-0.8 MG TABS        ALLERGIES:  Allergies   Allergen Reactions     Pneumococcal 13-Dayana Conj Vacc Dermatitis     Prevnar      Sulfa Drugs        FAMILY HISTORY:  Family History   Problem Relation Age of Onset     No Known Problems Mother      No Known Problems Father      Cancer Maternal Grandmother         leukemia     Diabetes Paternal Grandmother        SOCIAL HISTORY:  "  Social History     Socioeconomic History     Marital status: Single   Tobacco Use     Smoking status: Never Smoker     Smokeless tobacco: Never Used   Substance and Sexual Activity     Alcohol use: Yes     Comment: Alcoholic Drinks/day: one/year      Drug use: No     Sexual activity: Never     Comment: single        PHYSICAL EXAM:    /68   Pulse 69   Temp 98.4  F (36.9  C) (Temporal)   Resp 18   Ht 1.676 m (5' 6\")   Wt 72.6 kg (160 lb)   LMP 05/23/2022 (Approximate)   SpO2 100%   BMI 25.82 kg/m      Constitutional: Awake, alert, in no acute distress  Chest: No tenderness   Respiratory: Respirations even,   Cardiovascular: Regular rate and rhythm.+2 radial pulses, equal bilaterally.  No murmurs.   GI: Abdomen soft, non-tender to palpation in all 4 quadrants. No guarding or rebound. Bowel sounds intact on all 4 quadrants.  Pelvic exam performed with nurse chaperone reveals a closed cervix and no vaginal bleeding.  Mild right adnexal discomfort with palpation  Back: No CVA tenderness.    Integument: Warm, dry. No rash. No bruising or petechiae.  Lymphatic: No cervical lymphadenopathy  Neurologic: Alert & oriented x 3. Normal speech. Grossly normal motor and sensory function. No focal deficits noted.  NIHSS = 0  Psychiatric: Normal mood and affect. Normal judgement.    ED COURSE:  7:34 PM I introduced myself to the patient, obtained patient history, performed a physical exam, and discussed plan for ED workup including potential diagnostic laboratory/imaging studies and interventions.       LAB:  All pertinent labs reviewed and interpreted.  Results for orders placed or performed during the hospital encounter of 07/05/22   US OB < 14 Weeks Single    Impression    IMPRESSION:   1.  Single living intrauterine gestation at 7 weeks 6 days, EDC 02/15/2023.  2.  Benign physiologic 2.6 cm corpus luteal cyst right ovary requires no follow-up.       HCG qualitative Blood   Result Value Ref Range    hCG Serum " Qualitative Positive (A) Negative       RADIOLOGY:  Reviewed all pertinent imaging. Please see official radiology report.  US OB < 14 Weeks Single   Final Result   IMPRESSION:    1.  Single living intrauterine gestation at 7 weeks 6 days, EDC 02/15/2023.   2.  Benign physiologic 2.6 cm corpus luteal cyst right ovary requires no follow-up.                   MEDICATIONS GIVEN IN THE EMERGENCY:  Medications - No data to display        NEW PRESCRIPTIONS STARTED AT TODAY'S ER VISIT:  New Prescriptions    No medications on file          FINAL DIAGNOSIS:    ICD-10-CM    1. Intrauterine pregnancy  Z34.90    2. Corpus luteum cyst of right ovary  N83.11             At the conclusion of the encounter I discussed the results of all of the tests and the disposition. The questions were answered. The patient or family acknowledged understanding and was agreeable with the care plan.     NAME: Elsa Lomax  AGE: 26 year old female  YOB: 1995  MRN: [id number]  EVALUATION DATE & TIME: 7/5/2022  6:48 PM    PCP: Tobi Tang    ED PROVIDER: Golden Williamson M.D.      IKiya, am serving as a scribe to document services personally performed by Dr. Golden Williamson based on my observation and the provider's statements to me. I, Golden Williamson MD attest that Kiya Abernathy is acting in a scribe capacity, has observed my performance of the services and has documented them in accordance with my direction.    Golden Williamson M.D.  Emergency Medicine  [facility] EMERGENCY ROOM  [address] [address]  [phone]  Dept: [phone]  7/5/2022       Golden Williamson MD  07/05/22 4996

## 2022-07-06 NOTE — ED NOTES
Pelvic exam set-up and completed. Patient reports that she went to clinic earlier today and they completed vaginal swab. Does not want to have them done in ER as they are pending

## 2022-07-24 ENCOUNTER — HEALTH MAINTENANCE LETTER (OUTPATIENT)
Age: 27
End: 2022-07-24

## 2022-10-02 ENCOUNTER — HEALTH MAINTENANCE LETTER (OUTPATIENT)
Age: 27
End: 2022-10-02

## 2023-02-11 ENCOUNTER — HEALTH MAINTENANCE LETTER (OUTPATIENT)
Age: 28
End: 2023-02-11

## 2024-03-09 ENCOUNTER — HEALTH MAINTENANCE LETTER (OUTPATIENT)
Age: 29
End: 2024-03-09

## 2025-03-16 ENCOUNTER — HEALTH MAINTENANCE LETTER (OUTPATIENT)
Age: 30
End: 2025-03-16

## 2025-08-01 ENCOUNTER — APPOINTMENT (OUTPATIENT)
Dept: CT IMAGING | Facility: CLINIC | Age: 30
End: 2025-08-01
Attending: EMERGENCY MEDICINE
Payer: MEDICAID

## 2025-08-01 ENCOUNTER — HOSPITAL ENCOUNTER (EMERGENCY)
Facility: CLINIC | Age: 30
Discharge: HOME | End: 2025-08-01
Attending: EMERGENCY MEDICINE | Admitting: EMERGENCY MEDICINE
Payer: MEDICAID

## 2025-08-01 ENCOUNTER — APPOINTMENT (OUTPATIENT)
Dept: MRI IMAGING | Facility: CLINIC | Age: 30
End: 2025-08-01
Attending: EMERGENCY MEDICINE
Payer: MEDICAID

## 2025-08-01 VITALS
SYSTOLIC BLOOD PRESSURE: 117 MMHG | HEART RATE: 67 BPM | WEIGHT: 160 LBS | DIASTOLIC BLOOD PRESSURE: 77 MMHG | TEMPERATURE: 97.8 F | RESPIRATION RATE: 18 BRPM | BODY MASS INDEX: 25.82 KG/M2 | OXYGEN SATURATION: 100 %

## 2025-08-01 DIAGNOSIS — R07.9 ACUTE CHEST PAIN: ICD-10-CM

## 2025-08-01 DIAGNOSIS — R20.0 LEFT ARM NUMBNESS: ICD-10-CM

## 2025-08-01 DIAGNOSIS — R00.2 PALPITATIONS: ICD-10-CM

## 2025-08-01 DIAGNOSIS — R20.0 LEFT FACIAL NUMBNESS: ICD-10-CM

## 2025-08-01 DIAGNOSIS — R51.9 ACUTE NONINTRACTABLE HEADACHE, UNSPECIFIED HEADACHE TYPE: Primary | ICD-10-CM

## 2025-08-01 DIAGNOSIS — R20.0 LEFT LEG NUMBNESS: ICD-10-CM

## 2025-08-01 LAB
ANION GAP SERPL CALCULATED.3IONS-SCNC: 9 MMOL/L (ref 7–15)
APTT PPP: 30 SECONDS (ref 22–38)
ATRIAL RATE - MUSE: 85 BPM
BASOPHILS # BLD AUTO: 0 10E3/UL (ref 0–0.2)
BASOPHILS NFR BLD AUTO: 0 %
BUN SERPL-MCNC: 9.2 MG/DL (ref 6–20)
CALCIUM SERPL-MCNC: 9 MG/DL (ref 8.8–10.4)
CHLORIDE SERPL-SCNC: 103 MMOL/L (ref 98–107)
CREAT SERPL-MCNC: 0.87 MG/DL (ref 0.51–0.95)
DIASTOLIC BLOOD PRESSURE - MUSE: NORMAL MMHG
EGFRCR SERPLBLD CKD-EPI 2021: >90 ML/MIN/1.73M2
EOSINOPHIL # BLD AUTO: 0 10E3/UL (ref 0–0.7)
EOSINOPHIL NFR BLD AUTO: 0 %
ERYTHROCYTE [DISTWIDTH] IN BLOOD BY AUTOMATED COUNT: 11.9 % (ref 10–15)
GLUCOSE BLDC GLUCOMTR-MCNC: 89 MG/DL (ref 70–99)
GLUCOSE SERPL-MCNC: 87 MG/DL (ref 70–99)
HCG SERPL QL: NEGATIVE
HCO3 SERPL-SCNC: 23 MMOL/L (ref 22–29)
HCT VFR BLD AUTO: 37.6 % (ref 35–47)
HGB BLD-MCNC: 12.7 G/DL (ref 11.7–15.7)
IMM GRANULOCYTES # BLD: 0 10E3/UL
IMM GRANULOCYTES NFR BLD: 0 %
INR PPP: 1.07 (ref 0.85–1.15)
INTERPRETATION ECG - MUSE: NORMAL
LYMPHOCYTES # BLD AUTO: 2 10E3/UL (ref 0.8–5.3)
LYMPHOCYTES NFR BLD AUTO: 27 %
MAGNESIUM SERPL-MCNC: 1.8 MG/DL (ref 1.7–2.3)
MCH RBC QN AUTO: 29.5 PG (ref 26.5–33)
MCHC RBC AUTO-ENTMCNC: 33.8 G/DL (ref 31.5–36.5)
MCV RBC AUTO: 87 FL (ref 78–100)
MONOCYTES # BLD AUTO: 0.4 10E3/UL (ref 0–1.3)
MONOCYTES NFR BLD AUTO: 6 %
NEUTROPHILS # BLD AUTO: 4.8 10E3/UL (ref 1.6–8.3)
NEUTROPHILS NFR BLD AUTO: 66 %
NRBC # BLD AUTO: 0 10E3/UL
NRBC BLD AUTO-RTO: 0 /100
P AXIS - MUSE: 44 DEGREES
PLATELET # BLD AUTO: 234 10E3/UL (ref 150–450)
POTASSIUM SERPL-SCNC: 4 MMOL/L (ref 3.4–5.3)
PR INTERVAL - MUSE: 134 MS
PROTHROMBIN TIME: 14.1 SECONDS (ref 11.8–14.8)
QRS DURATION - MUSE: 80 MS
QT - MUSE: 382 MS
QTC - MUSE: 454 MS
R AXIS - MUSE: 42 DEGREES
RBC # BLD AUTO: 4.31 10E6/UL (ref 3.8–5.2)
SODIUM SERPL-SCNC: 135 MMOL/L (ref 135–145)
SYSTOLIC BLOOD PRESSURE - MUSE: NORMAL MMHG
T AXIS - MUSE: 36 DEGREES
TROPONIN T SERPL HS-MCNC: <6 NG/L
TROPONIN T SERPL HS-MCNC: <6 NG/L
VENTRICULAR RATE- MUSE: 85 BPM
WBC # BLD AUTO: 7.3 10E3/UL (ref 4–11)

## 2025-08-01 PROCEDURE — A9585 GADOBUTROL INJECTION: HCPCS | Performed by: EMERGENCY MEDICINE

## 2025-08-01 PROCEDURE — 36415 COLL VENOUS BLD VENIPUNCTURE: CPT | Performed by: EMERGENCY MEDICINE

## 2025-08-01 PROCEDURE — 93005 ELECTROCARDIOGRAM TRACING: CPT | Performed by: EMERGENCY MEDICINE

## 2025-08-01 PROCEDURE — 250N000011 HC RX IP 250 OP 636: Performed by: EMERGENCY MEDICINE

## 2025-08-01 PROCEDURE — 250N000013 HC RX MED GY IP 250 OP 250 PS 637: Performed by: EMERGENCY MEDICINE

## 2025-08-01 PROCEDURE — 96360 HYDRATION IV INFUSION INIT: CPT | Mod: 59

## 2025-08-01 PROCEDURE — 258N000003 HC RX IP 258 OP 636: Performed by: EMERGENCY MEDICINE

## 2025-08-01 PROCEDURE — 70498 CT ANGIOGRAPHY NECK: CPT

## 2025-08-01 PROCEDURE — 85610 PROTHROMBIN TIME: CPT | Performed by: EMERGENCY MEDICINE

## 2025-08-01 PROCEDURE — 84703 CHORIONIC GONADOTROPIN ASSAY: CPT | Performed by: EMERGENCY MEDICINE

## 2025-08-01 PROCEDURE — 85730 THROMBOPLASTIN TIME PARTIAL: CPT | Performed by: EMERGENCY MEDICINE

## 2025-08-01 PROCEDURE — 96361 HYDRATE IV INFUSION ADD-ON: CPT

## 2025-08-01 PROCEDURE — G0508 CRIT CARE TELEHEA CONSULT 60: HCPCS | Mod: G0

## 2025-08-01 PROCEDURE — 71275 CT ANGIOGRAPHY CHEST: CPT

## 2025-08-01 PROCEDURE — 83735 ASSAY OF MAGNESIUM: CPT | Performed by: EMERGENCY MEDICINE

## 2025-08-01 PROCEDURE — 84484 ASSAY OF TROPONIN QUANT: CPT | Performed by: EMERGENCY MEDICINE

## 2025-08-01 PROCEDURE — 85004 AUTOMATED DIFF WBC COUNT: CPT | Performed by: EMERGENCY MEDICINE

## 2025-08-01 PROCEDURE — 82962 GLUCOSE BLOOD TEST: CPT

## 2025-08-01 PROCEDURE — 80048 BASIC METABOLIC PNL TOTAL CA: CPT | Performed by: EMERGENCY MEDICINE

## 2025-08-01 PROCEDURE — 70553 MRI BRAIN STEM W/O & W/DYE: CPT

## 2025-08-01 PROCEDURE — 255N000002 HC RX 255 OP 636: Performed by: EMERGENCY MEDICINE

## 2025-08-01 PROCEDURE — 99285 EMERGENCY DEPT VISIT HI MDM: CPT | Mod: 25 | Performed by: EMERGENCY MEDICINE

## 2025-08-01 RX ORDER — IOPAMIDOL 755 MG/ML
75 INJECTION, SOLUTION INTRAVASCULAR ONCE
Status: COMPLETED | OUTPATIENT
Start: 2025-08-01 | End: 2025-08-01

## 2025-08-01 RX ORDER — GADOBUTROL 604.72 MG/ML
7 INJECTION INTRAVENOUS ONCE
Status: COMPLETED | OUTPATIENT
Start: 2025-08-01 | End: 2025-08-01

## 2025-08-01 RX ORDER — METOCLOPRAMIDE HYDROCHLORIDE 5 MG/ML
10 INJECTION INTRAMUSCULAR; INTRAVENOUS ONCE
Status: COMPLETED | OUTPATIENT
Start: 2025-08-01 | End: 2025-08-01

## 2025-08-01 RX ORDER — DIPHENHYDRAMINE HYDROCHLORIDE 50 MG/ML
25 INJECTION, SOLUTION INTRAMUSCULAR; INTRAVENOUS ONCE
Status: COMPLETED | OUTPATIENT
Start: 2025-08-01 | End: 2025-08-01

## 2025-08-01 RX ORDER — IOPAMIDOL 755 MG/ML
67 INJECTION, SOLUTION INTRAVASCULAR ONCE
Status: COMPLETED | OUTPATIENT
Start: 2025-08-01 | End: 2025-08-01

## 2025-08-01 RX ORDER — ACETAMINOPHEN 325 MG/1
975 TABLET ORAL ONCE
Status: COMPLETED | OUTPATIENT
Start: 2025-08-01 | End: 2025-08-01

## 2025-08-01 RX ADMIN — ACETAMINOPHEN 975 MG: 325 TABLET ORAL at 15:10

## 2025-08-01 RX ADMIN — IOPAMIDOL 75 ML: 755 INJECTION, SOLUTION INTRAVENOUS at 14:40

## 2025-08-01 RX ADMIN — SODIUM CHLORIDE 1000 ML: 0.9 INJECTION, SOLUTION INTRAVENOUS at 13:54

## 2025-08-01 RX ADMIN — IOPAMIDOL 67 ML: 755 INJECTION, SOLUTION INTRAVENOUS at 13:10

## 2025-08-01 RX ADMIN — GADOBUTROL 7 ML: 604.72 INJECTION INTRAVENOUS at 16:13

## 2025-08-01 ASSESSMENT — ENCOUNTER SYMPTOMS
SHORTNESS OF BREATH: 0
COUGH: 0
DIARRHEA: 0
FEVER: 0
HEADACHES: 0
CONFUSION: 0
NUMBNESS: 1
NAUSEA: 0
DYSURIA: 0
NERVOUS/ANXIOUS: 0
VOMITING: 0
WEAKNESS: 1
ABDOMINAL PAIN: 0

## 2025-08-01 ASSESSMENT — ACTIVITIES OF DAILY LIVING (ADL)
ADLS_ACUITY_SCORE: 41

## 2025-08-04 ENCOUNTER — PATIENT OUTREACH (OUTPATIENT)
Dept: CARE COORDINATION | Facility: CLINIC | Age: 30
End: 2025-08-04
Payer: MEDICAID

## 2025-08-06 ENCOUNTER — PATIENT OUTREACH (OUTPATIENT)
Dept: CARE COORDINATION | Facility: CLINIC | Age: 30
End: 2025-08-06
Payer: MEDICAID